# Patient Record
Sex: MALE | Race: WHITE | Employment: OTHER | URBAN - METROPOLITAN AREA
[De-identification: names, ages, dates, MRNs, and addresses within clinical notes are randomized per-mention and may not be internally consistent; named-entity substitution may affect disease eponyms.]

---

## 2020-08-30 ENCOUNTER — HOSPITAL ENCOUNTER (OUTPATIENT)
Dept: PREADMISSION TESTING | Age: 85
Setting detail: SPECIMEN
Discharge: HOME | End: 2020-09-03
Payer: COMMERCIAL

## 2020-08-30 PROCEDURE — U0003 INFECTIOUS AGENT DETECTION BY NUCLEIC ACID (DNA OR RNA); SEVERE ACUTE RESPIRATORY SYNDROME CORONAVIRUS 2 (SARS-COV-2) (CORONAVIRUS DISEASE [COVID-19]), AMPLIFIED PROBE TECHNIQUE, MAKING USE OF HIGH THROUGHPUT TECHNOLOGIES AS DESCRIBED BY CMS-2020-01-R: HCPCS

## 2020-08-31 ENCOUNTER — HOSPITAL ENCOUNTER (OUTPATIENT)
Age: 85
Discharge: HOME | End: 2020-08-31
Payer: MEDICARE

## 2020-08-31 LAB
ABO/RH: NORMAL
ANION GAP SERPL CALCULATED.3IONS-SCNC: 10 MMOL/L (ref 9–17)
ANTIBODY SCREEN: NEGATIVE
ARM BAND NUMBER: NORMAL
BUN BLDV-MCNC: 17 MG/DL (ref 8–23)
BUN/CREAT BLD: ABNORMAL (ref 9–20)
CALCIUM SERPL-MCNC: 9.3 MG/DL (ref 8.6–10.4)
CHLORIDE BLD-SCNC: 103 MMOL/L (ref 98–107)
CO2: 30 MMOL/L (ref 20–31)
CREAT SERPL-MCNC: 0.81 MG/DL (ref 0.7–1.2)
EXPIRATION DATE: NORMAL
GFR AFRICAN AMERICAN: >60 ML/MIN
GFR NON-AFRICAN AMERICAN: >60 ML/MIN
GFR SERPL CREATININE-BSD FRML MDRD: ABNORMAL ML/MIN/{1.73_M2}
GFR SERPL CREATININE-BSD FRML MDRD: ABNORMAL ML/MIN/{1.73_M2}
GLUCOSE BLD-MCNC: 100 MG/DL (ref 70–99)
HCT VFR BLD CALC: 48.5 % (ref 40.7–50.3)
HEMOGLOBIN: 15.4 G/DL (ref 13–17)
INR BLD: 1
MCH RBC QN AUTO: 31.8 PG (ref 25.2–33.5)
MCHC RBC AUTO-ENTMCNC: 31.8 G/DL (ref 28.4–34.8)
MCV RBC AUTO: 100.2 FL (ref 82.6–102.9)
NRBC AUTOMATED: 0 PER 100 WBC
PDW BLD-RTO: 14.6 % (ref 11.8–14.4)
PLATELET # BLD: 238 K/UL (ref 138–453)
PMV BLD AUTO: 9.8 FL (ref 8.1–13.5)
POTASSIUM SERPL-SCNC: 4.5 MMOL/L (ref 3.7–5.3)
PROTHROMBIN TIME: 10.6 SEC (ref 9–12)
RBC # BLD: 4.84 M/UL (ref 4.21–5.77)
SODIUM BLD-SCNC: 143 MMOL/L (ref 135–144)
WBC # BLD: 6.7 K/UL (ref 3.5–11.3)

## 2020-08-31 PROCEDURE — 80048 BASIC METABOLIC PNL TOTAL CA: CPT

## 2020-08-31 PROCEDURE — 86901 BLOOD TYPING SEROLOGIC RH(D): CPT

## 2020-08-31 PROCEDURE — 86900 BLOOD TYPING SEROLOGIC ABO: CPT

## 2020-08-31 PROCEDURE — 85027 COMPLETE CBC AUTOMATED: CPT

## 2020-08-31 PROCEDURE — 85610 PROTHROMBIN TIME: CPT

## 2020-08-31 PROCEDURE — 86850 RBC ANTIBODY SCREEN: CPT

## 2020-08-31 PROCEDURE — 36415 COLL VENOUS BLD VENIPUNCTURE: CPT

## 2020-09-01 LAB — SARS-COV-2, NAA: NOT DETECTED

## 2020-09-02 ENCOUNTER — TELEPHONE (OUTPATIENT)
Dept: PRIMARY CARE CLINIC | Age: 85
End: 2020-09-02

## 2020-09-02 ENCOUNTER — ANESTHESIA EVENT (OUTPATIENT)
Dept: CARDIAC CATH/INVASIVE PROCEDURES | Age: 85
End: 2020-09-02

## 2020-09-03 ENCOUNTER — HOSPITAL ENCOUNTER (INPATIENT)
Dept: CARDIAC CATH/INVASIVE PROCEDURES | Age: 85
LOS: 1 days | Discharge: HOME | DRG: 269 | End: 2020-09-04
Attending: SURGERY | Admitting: SURGERY
Payer: MEDICARE

## 2020-09-03 ENCOUNTER — ANESTHESIA (OUTPATIENT)
Dept: CARDIAC CATH/INVASIVE PROCEDURES | Age: 85
End: 2020-09-03

## 2020-09-03 VITALS — OXYGEN SATURATION: 94 % | TEMPERATURE: 94.8 F | DIASTOLIC BLOOD PRESSURE: 69 MMHG | SYSTOLIC BLOOD PRESSURE: 105 MMHG

## 2020-09-03 PROBLEM — I71.40 AAA (ABDOMINAL AORTIC ANEURYSM) WITHOUT RUPTURE: Status: ACTIVE | Noted: 2020-09-03

## 2020-09-03 LAB
BILIRUBIN URINE: NEGATIVE
COLOR: YELLOW
COMMENT UA: NORMAL
GLUCOSE URINE: NEGATIVE
KETONES, URINE: NEGATIVE
LEUKOCYTE ESTERASE, URINE: NEGATIVE
NITRITE, URINE: NEGATIVE
PH UA: 5 (ref 5–8)
PROTEIN UA: NEGATIVE
SPECIFIC GRAVITY UA: 1.01 (ref 1–1.03)
TURBIDITY: CLEAR
URINE HGB: NEGATIVE
UROBILINOGEN, URINE: NORMAL

## 2020-09-03 PROCEDURE — C1887 CATHETER, GUIDING: HCPCS

## 2020-09-03 PROCEDURE — C1894 INTRO/SHEATH, NON-LASER: HCPCS

## 2020-09-03 PROCEDURE — 2500000003 HC RX 250 WO HCPCS

## 2020-09-03 PROCEDURE — 3700000001 HC ADD 15 MINUTES (ANESTHESIA)

## 2020-09-03 PROCEDURE — C1769 GUIDE WIRE: HCPCS

## 2020-09-03 PROCEDURE — 6370000000 HC RX 637 (ALT 250 FOR IP): Performed by: SURGERY

## 2020-09-03 PROCEDURE — 37799 UNLISTED PX VASCULAR SURGERY: CPT

## 2020-09-03 PROCEDURE — 34712 TCAT DLVR ENHNCD FIXJ DEV: CPT | Performed by: SURGERY

## 2020-09-03 PROCEDURE — 81003 URINALYSIS AUTO W/O SCOPE: CPT

## 2020-09-03 PROCEDURE — 34705 EVAC RPR A-BIILIAC NDGFT: CPT | Performed by: SURGERY

## 2020-09-03 PROCEDURE — 2720000010 HC SURG SUPPLY STERILE

## 2020-09-03 PROCEDURE — 2780000010 HC IMPLANT OTHER

## 2020-09-03 PROCEDURE — 04V03DZ RESTRICTION OF ABDOMINAL AORTA WITH INTRALUMINAL DEVICE, PERCUTANEOUS APPROACH: ICD-10-PCS | Performed by: SURGERY

## 2020-09-03 PROCEDURE — 2500000003 HC RX 250 WO HCPCS: Performed by: STUDENT IN AN ORGANIZED HEALTH CARE EDUCATION/TRAINING PROGRAM

## 2020-09-03 PROCEDURE — 6360000004 HC RX CONTRAST MEDICATION

## 2020-09-03 PROCEDURE — 2580000003 HC RX 258: Performed by: SURGERY

## 2020-09-03 PROCEDURE — C1725 CATH, TRANSLUMIN NON-LASER: HCPCS

## 2020-09-03 PROCEDURE — 34713 PERQ ACCESS & CLSR FEM ART: CPT | Performed by: SURGERY

## 2020-09-03 PROCEDURE — 6360000002 HC RX W HCPCS: Performed by: SURGERY

## 2020-09-03 PROCEDURE — 6360000002 HC RX W HCPCS

## 2020-09-03 PROCEDURE — 2709999900 HC NON-CHARGEABLE SUPPLY

## 2020-09-03 PROCEDURE — 3700000000 HC ANESTHESIA ATTENDED CARE

## 2020-09-03 PROCEDURE — C1760 CLOSURE DEV, VASC: HCPCS

## 2020-09-03 PROCEDURE — G0269 OCCLUSIVE DEVICE IN VEIN ART: HCPCS | Performed by: SURGERY

## 2020-09-03 RX ORDER — HYDROCODONE BITARTRATE AND ACETAMINOPHEN 5; 325 MG/1; MG/1
2 TABLET ORAL EVERY 4 HOURS PRN
Status: DISCONTINUED | OUTPATIENT
Start: 2020-09-03 | End: 2020-09-04 | Stop reason: HOSPADM

## 2020-09-03 RX ORDER — ATENOLOL 50 MG/1
50 TABLET ORAL DAILY
COMMUNITY

## 2020-09-03 RX ORDER — ENALAPRILAT 2.5 MG/2ML
2.5 INJECTION INTRAVENOUS ONCE
Status: COMPLETED | OUTPATIENT
Start: 2020-09-03 | End: 2020-09-03

## 2020-09-03 RX ORDER — ONDANSETRON 2 MG/ML
4 INJECTION INTRAMUSCULAR; INTRAVENOUS EVERY 6 HOURS PRN
Status: DISCONTINUED | OUTPATIENT
Start: 2020-09-03 | End: 2020-09-04

## 2020-09-03 RX ORDER — LORATADINE 10 MG/1
10 TABLET ORAL DAILY
COMMUNITY

## 2020-09-03 RX ORDER — LIDOCAINE HYDROCHLORIDE 10 MG/ML
INJECTION, SOLUTION EPIDURAL; INFILTRATION; INTRACAUDAL; PERINEURAL PRN
Status: DISCONTINUED | OUTPATIENT
Start: 2020-09-03 | End: 2020-09-03 | Stop reason: SDUPTHER

## 2020-09-03 RX ORDER — SODIUM CHLORIDE 0.9 % (FLUSH) 0.9 %
10 SYRINGE (ML) INJECTION EVERY 12 HOURS SCHEDULED
Status: DISCONTINUED | OUTPATIENT
Start: 2020-09-03 | End: 2020-09-04 | Stop reason: HOSPADM

## 2020-09-03 RX ORDER — FENTANYL CITRATE 50 UG/ML
25 INJECTION, SOLUTION INTRAMUSCULAR; INTRAVENOUS EVERY 5 MIN PRN
Status: DISCONTINUED | OUTPATIENT
Start: 2020-09-03 | End: 2020-09-03

## 2020-09-03 RX ORDER — HYDRALAZINE HYDROCHLORIDE 20 MG/ML
10 INJECTION INTRAMUSCULAR; INTRAVENOUS ONCE
Status: DISCONTINUED | OUTPATIENT
Start: 2020-09-03 | End: 2020-09-03

## 2020-09-03 RX ORDER — SODIUM CHLORIDE 9 MG/ML
INJECTION, SOLUTION INTRAVENOUS CONTINUOUS
Status: DISCONTINUED | OUTPATIENT
Start: 2020-09-03 | End: 2020-09-04

## 2020-09-03 RX ORDER — MIDAZOLAM HYDROCHLORIDE 1 MG/ML
INJECTION INTRAMUSCULAR; INTRAVENOUS PRN
Status: DISCONTINUED | OUTPATIENT
Start: 2020-09-03 | End: 2020-09-03 | Stop reason: SDUPTHER

## 2020-09-03 RX ORDER — GUAIFENESIN 600 MG/1
1200 TABLET, EXTENDED RELEASE ORAL 2 TIMES DAILY
Status: DISCONTINUED | OUTPATIENT
Start: 2020-09-03 | End: 2020-09-04 | Stop reason: HOSPADM

## 2020-09-03 RX ORDER — OXYCODONE HYDROCHLORIDE AND ACETAMINOPHEN 5; 325 MG/1; MG/1
1 TABLET ORAL PRN
Status: DISCONTINUED | OUTPATIENT
Start: 2020-09-03 | End: 2020-09-03

## 2020-09-03 RX ORDER — ONDANSETRON 2 MG/ML
4 INJECTION INTRAMUSCULAR; INTRAVENOUS
Status: DISCONTINUED | OUTPATIENT
Start: 2020-09-03 | End: 2020-09-03

## 2020-09-03 RX ORDER — DIPHENHYDRAMINE HYDROCHLORIDE 50 MG/ML
12.5 INJECTION INTRAMUSCULAR; INTRAVENOUS
Status: DISCONTINUED | OUTPATIENT
Start: 2020-09-03 | End: 2020-09-03

## 2020-09-03 RX ORDER — DEXAMETHASONE SODIUM PHOSPHATE 4 MG/ML
4 INJECTION, SOLUTION INTRA-ARTICULAR; INTRALESIONAL; INTRAMUSCULAR; INTRAVENOUS; SOFT TISSUE
Status: DISCONTINUED | OUTPATIENT
Start: 2020-09-03 | End: 2020-09-03

## 2020-09-03 RX ORDER — SODIUM CHLORIDE 0.9 % (FLUSH) 0.9 %
10 SYRINGE (ML) INJECTION PRN
Status: DISCONTINUED | OUTPATIENT
Start: 2020-09-03 | End: 2020-09-04 | Stop reason: HOSPADM

## 2020-09-03 RX ORDER — PROMETHAZINE HYDROCHLORIDE 25 MG/1
12.5 TABLET ORAL EVERY 6 HOURS PRN
Status: DISCONTINUED | OUTPATIENT
Start: 2020-09-03 | End: 2020-09-04 | Stop reason: HOSPADM

## 2020-09-03 RX ORDER — MORPHINE SULFATE 2 MG/ML
2 INJECTION, SOLUTION INTRAMUSCULAR; INTRAVENOUS
Status: DISCONTINUED | OUTPATIENT
Start: 2020-09-03 | End: 2020-09-04 | Stop reason: HOSPADM

## 2020-09-03 RX ORDER — BUPIVACAINE HYDROCHLORIDE 7.5 MG/ML
INJECTION, SOLUTION INTRASPINAL PRN
Status: DISCONTINUED | OUTPATIENT
Start: 2020-09-03 | End: 2020-09-03 | Stop reason: SDUPTHER

## 2020-09-03 RX ORDER — FUROSEMIDE 40 MG/1
40 TABLET ORAL DAILY
Status: DISCONTINUED | OUTPATIENT
Start: 2020-09-03 | End: 2020-09-04 | Stop reason: HOSPADM

## 2020-09-03 RX ORDER — ACETAMINOPHEN 325 MG/1
650 TABLET ORAL EVERY 4 HOURS PRN
Status: DISCONTINUED | OUTPATIENT
Start: 2020-09-03 | End: 2020-09-04 | Stop reason: HOSPADM

## 2020-09-03 RX ORDER — FENTANYL CITRATE 50 UG/ML
INJECTION, SOLUTION INTRAMUSCULAR; INTRAVENOUS PRN
Status: DISCONTINUED | OUTPATIENT
Start: 2020-09-03 | End: 2020-09-03 | Stop reason: SDUPTHER

## 2020-09-03 RX ORDER — FUROSEMIDE 40 MG/1
40 TABLET ORAL DAILY
COMMUNITY

## 2020-09-03 RX ORDER — IBUPROFEN 800 MG/1
800 TABLET ORAL DAILY
COMMUNITY

## 2020-09-03 RX ORDER — LABETALOL HYDROCHLORIDE 5 MG/ML
5 INJECTION, SOLUTION INTRAVENOUS EVERY 10 MIN PRN
Status: DISCONTINUED | OUTPATIENT
Start: 2020-09-03 | End: 2020-09-03

## 2020-09-03 RX ORDER — PROPOFOL 10 MG/ML
INJECTION, EMULSION INTRAVENOUS CONTINUOUS PRN
Status: DISCONTINUED | OUTPATIENT
Start: 2020-09-03 | End: 2020-09-03 | Stop reason: SDUPTHER

## 2020-09-03 RX ORDER — OXYCODONE HYDROCHLORIDE AND ACETAMINOPHEN 5; 325 MG/1; MG/1
2 TABLET ORAL PRN
Status: DISCONTINUED | OUTPATIENT
Start: 2020-09-03 | End: 2020-09-03

## 2020-09-03 RX ORDER — HEPARIN SODIUM 1000 [USP'U]/ML
INJECTION, SOLUTION INTRAVENOUS; SUBCUTANEOUS PRN
Status: DISCONTINUED | OUTPATIENT
Start: 2020-09-03 | End: 2020-09-03 | Stop reason: SDUPTHER

## 2020-09-03 RX ORDER — EPHEDRINE SULFATE/0.9% NACL/PF 50 MG/5 ML
SYRINGE (ML) INTRAVENOUS PRN
Status: DISCONTINUED | OUTPATIENT
Start: 2020-09-03 | End: 2020-09-03 | Stop reason: SDUPTHER

## 2020-09-03 RX ORDER — IBUPROFEN 800 MG/1
800 TABLET ORAL DAILY
Status: DISCONTINUED | OUTPATIENT
Start: 2020-09-03 | End: 2020-09-04 | Stop reason: HOSPADM

## 2020-09-03 RX ORDER — HYDRALAZINE HYDROCHLORIDE 20 MG/ML
5 INJECTION INTRAMUSCULAR; INTRAVENOUS EVERY 10 MIN PRN
Status: DISCONTINUED | OUTPATIENT
Start: 2020-09-03 | End: 2020-09-03

## 2020-09-03 RX ORDER — GLYCOPYRROLATE 1 MG/5 ML
SYRINGE (ML) INTRAVENOUS PRN
Status: DISCONTINUED | OUTPATIENT
Start: 2020-09-03 | End: 2020-09-03 | Stop reason: SDUPTHER

## 2020-09-03 RX ORDER — GUAIFENESIN 600 MG/1
1200 TABLET, EXTENDED RELEASE ORAL 2 TIMES DAILY
COMMUNITY

## 2020-09-03 RX ORDER — ENALAPRILAT 2.5 MG/2ML
INJECTION INTRAVENOUS
Status: DISPENSED
Start: 2020-09-03 | End: 2020-09-04

## 2020-09-03 RX ORDER — MORPHINE SULFATE 4 MG/ML
4 INJECTION, SOLUTION INTRAMUSCULAR; INTRAVENOUS
Status: DISCONTINUED | OUTPATIENT
Start: 2020-09-03 | End: 2020-09-04 | Stop reason: HOSPADM

## 2020-09-03 RX ORDER — ATENOLOL 50 MG/1
50 TABLET ORAL DAILY
Status: DISCONTINUED | OUTPATIENT
Start: 2020-09-03 | End: 2020-09-04 | Stop reason: HOSPADM

## 2020-09-03 RX ORDER — HYDROCODONE BITARTRATE AND ACETAMINOPHEN 5; 325 MG/1; MG/1
1 TABLET ORAL EVERY 4 HOURS PRN
Status: DISCONTINUED | OUTPATIENT
Start: 2020-09-03 | End: 2020-09-04 | Stop reason: HOSPADM

## 2020-09-03 RX ADMIN — MIDAZOLAM HYDROCHLORIDE 1 MG: 1 INJECTION INTRAMUSCULAR; INTRAVENOUS at 08:40

## 2020-09-03 RX ADMIN — FENTANYL CITRATE 25 MCG: 50 INJECTION, SOLUTION INTRAMUSCULAR; INTRAVENOUS at 10:10

## 2020-09-03 RX ADMIN — LIDOCAINE HYDROCHLORIDE 3 ML: 10 INJECTION, SOLUTION EPIDURAL; INFILTRATION; INTRACAUDAL; PERINEURAL at 08:48

## 2020-09-03 RX ADMIN — Medication 5 MG: at 09:17

## 2020-09-03 RX ADMIN — ENOXAPARIN SODIUM 40 MG: 40 INJECTION SUBCUTANEOUS at 12:20

## 2020-09-03 RX ADMIN — Medication 5 MG: at 10:10

## 2020-09-03 RX ADMIN — Medication 5 MG: at 10:30

## 2020-09-03 RX ADMIN — SODIUM CHLORIDE, PRESERVATIVE FREE 10 ML: 5 INJECTION INTRAVENOUS at 11:25

## 2020-09-03 RX ADMIN — SODIUM CHLORIDE: 9 INJECTION, SOLUTION INTRAVENOUS at 07:59

## 2020-09-03 RX ADMIN — Medication 5 MG: at 09:36

## 2020-09-03 RX ADMIN — FENTANYL CITRATE 50 MCG: 50 INJECTION, SOLUTION INTRAMUSCULAR; INTRAVENOUS at 08:40

## 2020-09-03 RX ADMIN — Medication 5 MG: at 10:38

## 2020-09-03 RX ADMIN — SODIUM CHLORIDE, PRESERVATIVE FREE 10 ML: 5 INJECTION INTRAVENOUS at 20:50

## 2020-09-03 RX ADMIN — GUAIFENESIN 1200 MG: 600 TABLET, EXTENDED RELEASE ORAL at 20:50

## 2020-09-03 RX ADMIN — FENTANYL CITRATE 25 MCG: 50 INJECTION, SOLUTION INTRAMUSCULAR; INTRAVENOUS at 10:28

## 2020-09-03 RX ADMIN — SODIUM CHLORIDE: 9 INJECTION, SOLUTION INTRAVENOUS at 10:21

## 2020-09-03 RX ADMIN — MIDAZOLAM HYDROCHLORIDE 1 MG: 1 INJECTION INTRAMUSCULAR; INTRAVENOUS at 10:10

## 2020-09-03 RX ADMIN — SODIUM CHLORIDE: 9 INJECTION, SOLUTION INTRAVENOUS at 11:24

## 2020-09-03 RX ADMIN — HEPARIN SODIUM 2000 UNITS: 1000 INJECTION, SOLUTION INTRAVENOUS; SUBCUTANEOUS at 10:20

## 2020-09-03 RX ADMIN — CEFAZOLIN 2 G: 10 INJECTION, POWDER, FOR SOLUTION INTRAVENOUS at 12:20

## 2020-09-03 RX ADMIN — Medication 5 MG: at 10:50

## 2020-09-03 RX ADMIN — HYDROCODONE BITARTRATE AND ACETAMINOPHEN 2 TABLET: 5; 325 TABLET ORAL at 18:30

## 2020-09-03 RX ADMIN — CEFAZOLIN 2 G: 10 INJECTION, POWDER, FOR SOLUTION INTRAVENOUS at 18:30

## 2020-09-03 RX ADMIN — PROPOFOL 25 MCG/KG/MIN: 10 INJECTION, EMULSION INTRAVENOUS at 09:20

## 2020-09-03 RX ADMIN — ENALAPRILAT 2.5 MG: 1.25 INJECTION INTRAVENOUS at 13:09

## 2020-09-03 RX ADMIN — BUPIVACAINE HYDROCHLORIDE 2 ML: 7.5 INJECTION, SOLUTION INTRASPINAL at 08:48

## 2020-09-03 RX ADMIN — HEPARIN SODIUM 6000 UNITS: 1000 INJECTION, SOLUTION INTRAVENOUS; SUBCUTANEOUS at 09:20

## 2020-09-03 RX ADMIN — Medication 0.2 MG: at 09:11

## 2020-09-03 ASSESSMENT — ENCOUNTER SYMPTOMS: SHORTNESS OF BREATH: 1

## 2020-09-03 ASSESSMENT — PULMONARY FUNCTION TESTS
PIF_VALUE: 0
PIF_VALUE: 1
PIF_VALUE: 0
PIF_VALUE: 1
PIF_VALUE: 1
PIF_VALUE: 0
PIF_VALUE: 1
PIF_VALUE: 1
PIF_VALUE: 0
PIF_VALUE: 1
PIF_VALUE: 0
PIF_VALUE: 1
PIF_VALUE: 0
PIF_VALUE: 1
PIF_VALUE: 0
PIF_VALUE: 1
PIF_VALUE: 0
PIF_VALUE: 1
PIF_VALUE: 0
PIF_VALUE: 1
PIF_VALUE: 0
PIF_VALUE: 1
PIF_VALUE: 0
PIF_VALUE: 1
PIF_VALUE: 0
PIF_VALUE: 31
PIF_VALUE: 0
PIF_VALUE: 0
PIF_VALUE: 1
PIF_VALUE: 0
PIF_VALUE: 1
PIF_VALUE: 0
PIF_VALUE: 1
PIF_VALUE: 0
PIF_VALUE: 1
PIF_VALUE: 0
PIF_VALUE: 29
PIF_VALUE: 0
PIF_VALUE: 1
PIF_VALUE: 0
PIF_VALUE: 1

## 2020-09-03 ASSESSMENT — PAIN SCALES - GENERAL
PAINLEVEL_OUTOF10: 9
PAINLEVEL_OUTOF10: 0

## 2020-09-03 NOTE — ANESTHESIA PRE PROCEDURE
10.6 08/31/2020    INR 1.0 08/31/2020       HCG (If Applicable): No results found for: PREGTESTUR, PREGSERUM, HCG, HCGQUANT     ABGs: No results found for: PHART, PO2ART, XOD5UCT, EMU7BZB, BEART, P8LMZJVQ     Type & Screen (If Applicable):  No results found for: LABABO, LABRH    Drug/Infectious Status (If Applicable):  No results found for: HIV, HEPCAB    COVID-19 Screening (If Applicable):   Lab Results   Component Value Date    COVID19 Not Detected 08/30/2020         Anesthesia Evaluation  Patient summary reviewed and Nursing notes reviewed  Airway: Mallampati: II  TM distance: >3 FB   Neck ROM: full  Mouth opening: > = 3 FB Dental: normal exam         Pulmonary:Negative Pulmonary ROS and normal exam  breath sounds clear to auscultation  (+) shortness of breath:                             Cardiovascular:Negative CV ROS  Exercise tolerance: poor (<4 METS),   (+) past MI:,       ECG reviewed  Rhythm: regular  Rate: normal                    Neuro/Psych:   Negative Neuro/Psych ROS              GI/Hepatic/Renal: Neg GI/Hepatic/Renal ROS            Endo/Other: Negative Endo/Other ROS                    Abdominal:       Abdomen: soft. Vascular:                                      Anesthesia Plan      general and MAC     ASA 4     (Will plan for MAC and Spinal anesthesia, and General anesthesia as back up.)  Induction: intravenous. arterial line  MIPS: Postoperative opioids intended and Prophylactic antiemetics administered. Anesthetic plan and risks discussed with patient and child/children. Use of blood products discussed with patient and child/children whom consented to blood products. Plan discussed with attending and CRNA.     Attending anesthesiologist reviewed and agrees with Dori Estes MD   9/3/2020

## 2020-09-03 NOTE — H&P
VASCULAR SURGERY H&P  [address]      Patient's Name/ Date of Birth/ Gender: Dominique Lob / 1935 [de-identified]81 y.o.) / male     Surgeon: Dr. Carlos Rose    History of present Illness: Mr. Elvira Ledesma is an 81-year-old gentleman with history of COPD on 4 L nasal cannula and short-term memory loss and recently found to have a 5.2 cm infrarenal AAA on CTA during a urologic work-up. He presents to [facility] for scheduled AAA repair today. He has a history of COPD, hypertension, prostate cancer, skin cancer, and there is concern for malignancy within the lung on recent CT scan. He has had tonsillectomy, prostatectomy, and left-sided hernia repair. Past Medical History:  has a past medical history of Aortic aneurysm ([address] Arthritis, COPD (chronic obstructive pulmonary disease) ([address] BROWN (dyspnea on exertion), Former smoker, Herpes genitalis, HTN (hypertension), Inguinal hernia, On home oxygen therapy, Prostate cancer ([address] Short-term memory loss, Skin cancer, and Walker as ambulation aid. Past Surgical History:   Past Surgical History:   Procedure Laterality Date    ENDOVASCULAR THORACIC AORTIC ANEURYSM REPAIR  09/03/2020    DR Jose Manuel Martinez REYES    HERNIA REPAIR      PROSTATECTOMY      SKIN CANCER EXCISION      FOREHEAD / RIGHT CHEST    TONSILLECTOMY AND ADENOIDECTOMY         Social History:  reports that he has quit smoking. He has never used smokeless tobacco. He reports current alcohol use. He reports that he does not use drugs. Family History: family history is not on file.     Review of Systems:   General: Denies fever, chills, night sweats, weight loss, malaise, fatigue  HEENT: Denies sore throat, sinus problems, allergic rhinosinusitis  Card: Denies chest pain, palpitations, orthopnea/PND  Pulm: Denies cough, shortness of breath  GI: denies history of constipation, diarrhea, hematochezia or melena  : Denies polyuria, dysuria, hematuria  Endo: Denies diabetes, thyroid problems. Heme: Denies anemia, h/o bleeding or clotting problems. Neuro: Denies h/o CVA, TIA, +memory loss  Skin: Denies rashes, ulcers  Musculoskeletal: Denies muscle, joint, back pain. Allergies: Pcn [penicillins]    Current Meds:  Current Outpatient Medications:     atenolol (TENORMIN) 50 MG tablet, Take 50 mg by mouth daily, Disp: , Rfl:     furosemide (LASIX) 40 MG tablet, Take 40 mg by mouth daily, Disp: , Rfl:     ibuprofen (ADVIL;MOTRIN) 800 MG tablet, Take 800 mg by mouth daily, Disp: , Rfl:     NONFORMULARY, Take 1 tablet by mouth 2 times daily PRESER VISION  /  AREDS2, Disp: , Rfl:     guaiFENesin (MUCINEX) 600 MG extended release tablet, Take 1,200 mg by mouth 2 times daily, Disp: , Rfl:     loratadine (CLARITIN) 10 MG tablet, Take 10 mg by mouth daily, Disp: , Rfl:     Multiple Vitamins-Minerals (CENTRAL-DEANDRA PO), Take 1 tablet by mouth every evening, Disp: , Rfl:     Current Facility-Administered Medications:     ceFAZolin (ANCEF) 2 g in dextrose 5 % 50 mL IVPB, 2 g, Intravenous, Once, Ramu Hamilton MD    0.9 % sodium chloride infusion, , Intravenous, Continuous, Ramu Hamilton MD, Last Rate: 75 mL/hr at 09/03/20 0759    Vital Signs:  Vitals:    09/03/20 0752   BP: 131/76   Pulse: 59   Resp: 20   Temp: 98.2 °F (36.8 °C)   SpO2: 95%       Physical Exam:  Vital signs and Nurse's note reviewed  Gen:  A&Ox3, NAD  HEENT: PERRLA, EOMI, no scleral icterus, oral mucosa moist  Neck: Supple  Chest: Symmetric rise with inhalation, no evidence of trauma  CVS: Regular rate and rhythm, no murmurs, no rubs or gallops  Resp: Equal chest rise bilaterally, on 4 L nasal cannula  Abd: soft, obese, non-tender, non-distended, no guarding or rebound.   Ext: Bilateral lower extremity edema  CNS: Moves all extremities, no gross focal motor deficits    Labs:   Lab Results   Component Value Date    WBC 6.7 08/31/2020    HGB 15.4 08/31/2020    HCT 48.5 08/31/2020    .2 08/31/2020     08/31/2020     Lab Results   Component Value Date     08/31/2020    K 4.5 08/31/2020     08/31/2020    CO2 30 08/31/2020    BUN 17 08/31/2020    CREATININE 0.81 08/31/2020    GLUCOSE 100 08/31/2020    CALCIUM 9.3 08/31/2020     No results found for: ALKPHOS, ALT, AST, PROT, BILITOT, BILIDIR, LABALBU  No results found for: LACTA  No results found for: AMYLASE  No results found for: LIPASE  Lab Results   Component Value Date    INR 1.0 08/31/2020       Imaging:  CTA abdomen (3/12/2020)  5.2 cm short axis diameter infrarenal AAA without evidence of leak or rupture, aneurysm ends at aortic bifurcation. Ectasia of the infrarenal aorta and focal kinking just below the renal artery origin. No evidence of hemodynamically significant stenosis. Ectasia of bilateral common iliac arteries. External iliac arteries are patent but tortuous. 2.5 x 2.4 cm irregular nodule in left lung base concerning for malignancy. Impression:  81-year-old male with history of 5.2 cm infrarenal AAA presenting for elective endovascular repair. Recommendation:  1. To OR today for endovascular repair of AAA  2. Will attempt to do under spinal and local anesthesia due to medical comorbidities. 3.  Risks, benefits, and alternatives of repair were explained to patient and family at bedside and they agreed to continue with treatment plan.     Kacey Hinson MD  General Surgery PGY3  Available via [employer]  Attending: Arlin Garcia

## 2020-09-03 NOTE — ANESTHESIA PROCEDURE NOTES
Arterial Line:    An arterial line was placed using ultrasound guidance, in the OR for the following indication(s): continuous blood pressure monitoring. A 20 gauge (size), 1 and 3/4 inch (length), (type) catheter was placed, into the left radial artery, secured by . Events:  patient tolerated procedure well with no complications.   9/3/2020 9:00 AM9/3/2020 9:03 AM  Anesthesiologist: Penelope West MD  Resident/CRNA: SARAH Marin - PAXTON  Other anesthesia staff: Kamla Jones RN  Preanesthetic Checklist  Completed: patient identified, site marked, surgical consent, pre-op evaluation, timeout performed, IV checked, risks and benefits discussed, monitors and equipment checked, anesthesia consent given, oxygen available and patient being monitored

## 2020-09-03 NOTE — PROGRESS NOTES
Dr. Edu Rodriguez contacted for patients blood pressure -160s. Dr. Edu Rodriguez stated he wanted patients blood pressure to be made normotensive. Dr. Edu Rodriguez was made aware of patient's hypertensive state. No new orders.

## 2020-09-03 NOTE — PLAN OF CARE
Problem: Falls - Risk of:  Goal: Will remain free from falls  Description: Will remain free from falls  Outcome: Ongoing  Goal: Absence of physical injury  Description: Absence of physical injury  Outcome: Ongoing     Problem: Anxiety:  Goal: Level of anxiety will decrease  Description: Level of anxiety will decrease  Outcome: Met This Shift   PRE-PROCEDURAL EDUCATION COMPLETED

## 2020-09-03 NOTE — ANESTHESIA POSTPROCEDURE EVALUATION
Department of Anesthesiology  Postprocedure Note    Patient: Lowell Lowery  MRN: [id number]  YOB: 1935  Date of evaluation: 9/3/2020  Time:  4:34 PM     Procedure Summary     Date:  09/03/20 Room / Location:  [facility] Cath Lab    Anesthesia Start:  0825 Anesthesia Stop:  1015    Procedure:  AAA ENDOGRAFT W/ ANESTHESIA Diagnosis:       AAA (abdominal aortic aneurysm, ruptured) [address]      Endoleak post (EVAR) endovascular aneurysm repair, initial encounter ([facility]    Scheduled Providers:  Carli Roque MD Responsible Provider:  Carli Roque MD    Anesthesia Type:  general, MAC ASA Status:  3          Anesthesia Type: general, MAC    Kendall Phase I:      Kendall Phase II:      Last vitals: Reviewed and per EMR flowsheets.        Anesthesia Post Evaluation    Patient location during evaluation: PACU  Patient participation: complete - patient participated  Level of consciousness: awake and alert and awake  Pain score: 0  Airway patency: patent  Nausea & Vomiting: no nausea and no vomiting  Complications: no  Cardiovascular status: blood pressure returned to baseline  Respiratory status: acceptable  Hydration status: euvolemic

## 2020-09-03 NOTE — OP NOTE
Operative Note      Patient: Maru Madsen  YOB: 1935  MRN: [id number]    Date of Procedure: 9/3/2020    Pre-Op Diagnosis: Abdominal aortic aneurysm greater than 5 cm    Post-Op Diagnosis: Same       1) bilateral ultrasound-guided access common femoral arteries  2) endovascular repair of abdominal aortic aneurysm with endurance  3) fixation of the proximal neck with endo-anchors x8 and proximal cough      Surgeon: Austen Lucero MD    Assistant:  None    Anesthesia: Spinal, MAC    Estimated Blood Loss (mL): less than 50     Complications: None    Specimens:   * No specimens in log *    Implants:  * No implants in log *      Drains: * No LDAs found *    Findings: No evidence of endoleak on completion arteriogram    Indications and description of operative procedure: This is an 61-year-old male with COPD and home oxygen requirements. He was incidentally found to have a 5.3 cm infrarenal abdominal aortic aneurysm. He elected to undergo repair. After the risks benefits and alternatives were discussed informed consent was obtained. The patient was brought to the Cath Lab and placed supine spinal anesthesia was given along with IV sedation. The abdomen and groins were cleaned and prepped in the usual fashion sterile drapes were applied. Ultrasound was used to identify the bilateral common femoral arteries. Using a micropuncture needle and catheter access was gained. Both groins were preclosed with percutaneous closure devices x2. This was repeated in both the right and the left groin access sites. 8 Malagasy sheaths were then placed. The main body of the graft was planned for the left groin based on the patient's anatomy. A Glidewire was used to traverse the tortuous abdominal aorta into the thoracic aorta and a stiff Lunderquist wire was placed via the left side. The patient was heparinized with 6000 units of heparin and given an additional 2000 units after 1 hour.   Via the right groin access the Omni Flush catheter was placed to the level of the renal arteries. The main body the device was then advanced over the stiff wire and an aortogram of the neck was taken showing the renal arteries. This neck was highly angulated on CT scan at 90 degrees but straightened out somewhat with the stiff wire. The main body of the graft was then deployed below the level of the renal vessels in the suprarenal fixators were released. The graft was deployed until the contralateral limb was open. Using a steerable sheath the contralateral gate was successfully cannulated. A retrograde injection of contrast allowed for the right hypogastric to be shown in an English projection. An appropriately sized contralateral limb was then deployed a 16 x 24 x 124. Via the right femoral access the main body was deployed and again a retrograde shot in an LEA projection showed the hypogastric. A 16 x 20 x 124 was deployed on the left. A Reliant balloon was used to perform angioplasty of the entire length of the graft. On completion aortogram there was evidence of a type I endoleak and it appears as though 2 mm of the proximal graft was lower than expected below the renal artery takeoff. Endo-anchors were used to pin the right lateral wall against the aortic neck. An appropriate sized cuff was then deployed again infrarenally under magnification. An additional 5 endo-anchors were used to anchor the cuff into place. Balloon angioplasty was again performed of the proximal neck. On final completion arteriogram there is no evidence of endoleak whatsoever. The sheaths were then removed and the percutaneous closure devices were deployed. There is excellent hemostasis.   The procedure was tolerated well without complication

## 2020-09-03 NOTE — BRIEF OP NOTE
Brief Postoperative Note      Patient: Geni Kessler  YOB: 1935  MRN: [id number]    Date of Procedure: 9/3/2020    Pre-Op Diagnosis: Abdominal aortic aneurysm greater than 5 cm    Post-Op Diagnosis: Same       1) bilateral ultrasound-guided access common femoral arteries  2) endovascular repair of abdominal aortic aneurysm with endurance  3) fixation of the proximal neck with endo-anchors x8 and proximal cough      Surgeon: Kylee De Leon MD    Assistant:  None    Anesthesia: Spinal, MAC    Estimated Blood Loss (mL): less than 50     Complications: None    Specimens:   * No specimens in log *    Implants:  * No implants in log *      Drains: * No LDAs found *    Findings: No evidence of endoleak on completion arteriogram    Electronically signed by Kylee De Leon MD on 9/3/2020 at 11:06 AM

## 2020-09-04 VITALS
BODY MASS INDEX: 33.74 KG/M2 | WEIGHT: 249.12 LBS | HEIGHT: 72 IN | OXYGEN SATURATION: 95 % | HEART RATE: 66 BPM | RESPIRATION RATE: 21 BRPM | SYSTOLIC BLOOD PRESSURE: 91 MMHG | DIASTOLIC BLOOD PRESSURE: 48 MMHG | TEMPERATURE: 98.2 F

## 2020-09-04 PROBLEM — I71.40 AAA (ABDOMINAL AORTIC ANEURYSM): Status: ACTIVE | Noted: 2020-09-04

## 2020-09-04 LAB
ANION GAP SERPL CALCULATED.3IONS-SCNC: 7 MMOL/L (ref 9–17)
BUN BLDV-MCNC: 14 MG/DL (ref 8–23)
BUN/CREAT BLD: ABNORMAL (ref 9–20)
CALCIUM SERPL-MCNC: 8.2 MG/DL (ref 8.6–10.4)
CHLORIDE BLD-SCNC: 108 MMOL/L (ref 98–107)
CO2: 25 MMOL/L (ref 20–31)
CREAT SERPL-MCNC: 0.6 MG/DL (ref 0.7–1.2)
GFR AFRICAN AMERICAN: >60 ML/MIN
GFR NON-AFRICAN AMERICAN: >60 ML/MIN
GFR SERPL CREATININE-BSD FRML MDRD: ABNORMAL ML/MIN/{1.73_M2}
GFR SERPL CREATININE-BSD FRML MDRD: ABNORMAL ML/MIN/{1.73_M2}
GLUCOSE BLD-MCNC: 120 MG/DL (ref 70–99)
POTASSIUM SERPL-SCNC: 4.1 MMOL/L (ref 3.7–5.3)
SARS-COV-2, PCR: NORMAL
SARS-COV-2, RAPID: NOT DETECTED
SARS-COV-2: NORMAL
SODIUM BLD-SCNC: 140 MMOL/L (ref 135–144)
SOURCE: NORMAL

## 2020-09-04 PROCEDURE — 2580000003 HC RX 258: Performed by: SURGERY

## 2020-09-04 PROCEDURE — 80048 BASIC METABOLIC PNL TOTAL CA: CPT

## 2020-09-04 PROCEDURE — 6360000002 HC RX W HCPCS: Performed by: SURGERY

## 2020-09-04 PROCEDURE — 2000000000 HC ICU R&B

## 2020-09-04 PROCEDURE — U0002 COVID-19 LAB TEST NON-CDC: HCPCS

## 2020-09-04 PROCEDURE — 51798 US URINE CAPACITY MEASURE: CPT

## 2020-09-04 PROCEDURE — 6370000000 HC RX 637 (ALT 250 FOR IP): Performed by: SURGERY

## 2020-09-04 RX ORDER — ONDANSETRON 2 MG/ML
4 INJECTION INTRAMUSCULAR; INTRAVENOUS EVERY 6 HOURS PRN
Status: DISCONTINUED | OUTPATIENT
Start: 2020-09-04 | End: 2020-09-04 | Stop reason: HOSPADM

## 2020-09-04 RX ADMIN — ATENOLOL 50 MG: 50 TABLET ORAL at 07:48

## 2020-09-04 RX ADMIN — FUROSEMIDE 40 MG: 40 TABLET ORAL at 07:48

## 2020-09-04 RX ADMIN — GUAIFENESIN 1200 MG: 600 TABLET, EXTENDED RELEASE ORAL at 07:48

## 2020-09-04 RX ADMIN — ONDANSETRON 4 MG: 2 INJECTION, SOLUTION INTRAMUSCULAR; INTRAVENOUS at 02:19

## 2020-09-04 RX ADMIN — SODIUM CHLORIDE, PRESERVATIVE FREE 10 ML: 5 INJECTION INTRAVENOUS at 07:48

## 2020-09-04 RX ADMIN — IBUPROFEN 800 MG: 800 TABLET, FILM COATED ORAL at 07:48

## 2020-09-04 RX ADMIN — ENOXAPARIN SODIUM 40 MG: 40 INJECTION SUBCUTANEOUS at 07:48

## 2020-09-04 ASSESSMENT — PAIN SCALES - GENERAL
PAINLEVEL_OUTOF10: 0
PAINLEVEL_OUTOF10: 3

## 2020-09-04 NOTE — PROGRESS NOTES
Writer asked patient if he had voided at all since having his catheter removed. Pt states that he has not and is unwilling to attempt to urinate at this time. Pt states that he will notify writer and declines a bladder scan. States \"I just need some rest\".

## 2020-09-04 NOTE — PROGRESS NOTES
[facility] Progress Note            PATIENT NAME: Ashvin Members   TODAY'S DATE: 2020, 2:50 AM  DATE OF ADMISSION: 9/3/2020  LOS: 1    CC: No chief complaint on file. SUBJECTIVE:    Pt seen, evaluated, and chart reviewed. Pain controlled on current regimen. Hemodynamically stable. Tolerating diet. Voided at 0100 with 150 cc urine. Passing flatus. OBJECTIVE:     Vitals:  Temp  Av.9 °F (34.9 °C)  Min: 93.1 °F (33.9 °C)  Max: 98.8 °F (81.3 °C)  Systolic (06TCZ), ISB:021 , Min:100 , HLM:775   Diastolic (26JQY), RNL:02, Min:59, Max:115  Pulse  Av.3  Min: 56  Max: 85  Resp  Av.6  Min: 0  Max: 24  SpO2: 96 % SpO2  Av %  Min: 90 %  Max: 100 %     Intake/output:    I/O last 3 completed shifts: In: 2454 [P.O.:350; I.V.:2104]  Out: 100 [Blood:100]    Physical exam:        General: AOx3, NAD  Lungs: Equal chest rise bilaterally, no audible wheezes or rhonchi. Heart: Regular rate and rhythm. Warm and well perfused. Abdomen: Soft, ND, Nontender. Extremities: Moves all extremities x4, Bilateral lower extremity edema, Mild bilateral hematoma R>L, bilateral femoral puncture sites hemostatic. Labs:  No results for input(s): WBC, HGB, PLT in the last 72 hours. No results for input(s): NA, K, CL, CO2, BUN, CREATININE, GLUCOSE in the last 72 hours. No results for input(s): AST, ALT, ALB, ALKPHOS, BILITOT, BILIDIR in the last 72 hours. No results for input(s): APTT, PROT, INR in the last 72 hours. Radiology:  CTA abdomen (3/12/2020)  5.2 cm short axis diameter infrarenal AAA without evidence of leak or rupture, aneurysm ends at aortic bifurcation. Ectasia of the infrarenal aorta and focal kinking just below the renal artery origin. No evidence of hemodynamically significant stenosis. Ectasia of bilateral common iliac arteries. External iliac arteries are patent but tortuous. 2.5 x 2.4 cm irregular nodule in left lung base concerning for malignancy.     ASSESSMENT 61-year-old male with history of 5.2 cm infrarenal AAA s/p EVAR (9/3/20)    Problem List  Patient Active Problem List   Diagnosis    AAA (abdominal aortic aneurysm) without rupture (HCC)       PLAN  -Home meds: Lasix, atenolol-->started  -Pain and nausea control PRN  -Monitor vitals per unit standard  -Encourage IS, pulmonary hygeine  -Encourage ambulation  -Strict I/O's  -Diet: Regular  -VTE ppx: Lovenox    Dispo: Discharge home if voiding and and tolerating PO intake.      Zoila Francis MD  General Surgery PGY3  Available via [employer]  Attending: Noyr Shepherd MD

## 2020-09-04 NOTE — DISCHARGE INSTR - COC
Continuity of Care Form    Patient Name: Zhao Langston   :  1935  MRN:  [id number]    Admit date:  9/3/2020  Discharge date:  2020    Code Status Order: Full Code   Advance Directives:   [address] [facility] Documentation     Date/Time Healthcare Directive Type of Healthcare Directive Copy in [address] [place] [address] [address] Agent's Name Healthcare Agent's Phone Number    20 8888  No, patient does not have an advance directive for healthcare treatment -- -- -- -- --    20 0734  No, patient does not have an advance directive for healthcare treatment -- -- -- -- --          Admitting Physician:  Shannon Nunez MD  PCP: Gerhardt Duck, MD    Discharging Nurse: [facility] Unit/Room#: 0101/0101-01  Discharging Unit Phone Number: [phone]    Emergency Contact:   Extended Emergency Contact Information  Primary Emergency Contact: Mary Kay Waite  Address: N/A  Home Phone: [phone]  Relation: Child  Preferred language: English   needed? No  Secondary Emergency Contact: Olaf Lozano  Address: N/A  Home Phone: [phone]  Relation: Child  Preferred language: English   needed? No    Past Surgical History:  Past Surgical History:   Procedure Laterality Date    ENDOVASCULAR THORACIC AORTIC ANEURYSM REPAIR  2020    DR Bertie Engels REYES    HERNIA REPAIR      PROSTATECTOMY      SKIN CANCER EXCISION      FOREHEAD / RIGHT CHEST    TONSILLECTOMY AND ADENOIDECTOMY         Immunization History: There is no immunization history on file for this patient.     Active Problems:  Patient Active Problem List   Diagnosis Code    AAA (abdominal aortic aneurysm) without rupture ([facility]) I71.4    AAA (abdominal aortic aneurysm) ([facility] I71.4       Isolation/Infection:   Isolation          No Isolation        Patient Infection Status     Infection Onset Added Last Indicated Last Indicated By Review Planned Expiration Resolved Resolved By    None active Resolved    COVID-19 Rule Out 09/04/20 09/04/20 09/04/20 COVID-19 (Ordered)   09/04/20 Rule-Out Test Resulted    COVID-19 Rule Out 08/29/20 08/29/20 08/30/20 Covid-19 Ambulatory (Ordered)   09/01/20 Rule-Out Test Resulted          Nurse Assessment:  Last Vital Signs: BP (!) 91/48   Pulse 66   Temp 98.2 °F (36.8 °C) (Oral)   Resp 21   Ht 6' (1.829 m)   Wt 249 lb 1.9 oz (113 kg)   SpO2 95%   BMI 33.79 kg/m²     Last documented pain score (0-10 scale): Pain Level: 0  Last Weight:   Wt Readings from Last 1 Encounters:   09/03/20 249 lb 1.9 oz (113 kg)     Mental Status:  oriented    IV Access:  - None    Nursing Mobility/ADLs:  Walking   Independent  Transfer  Independent  Bathing  Independent  Dressing  Independent  Toileting  Independent  Feeding  Independent  Med Admin  Independent  Med Delivery   whole    Wound Care Documentation and Therapy:        Elimination:  Continence:   · Bowel: Yes  · Bladder: Yes  Urinary Catheter: None   Colostomy/Ileostomy/Ileal Conduit: No       Date of Last BM: Prior to admission    Intake/Output Summary (Last 24 hours) at 9/4/2020 1429  Last data filed at 9/4/2020 0600  Gross per 24 hour   Intake 1704 ml   Output 400 ml   Net 1304 ml     I/O last 3 completed shifts: In: 2454 [P.O.:350; I.V.:2104]  Out: 500 [Urine:400; Blood:100]    Safety Concerns: At Risk for Falls    Impairments/Disabilities:      Vision and Hearing    Nutrition Therapy:  Current Nutrition Therapy:   - Oral Diet:  General    Routes of Feeding: Oral  Liquids: No Restrictions  Daily Fluid Restriction: no  Last Modified Barium Swallow with Video (Video Swallowing Test): not done    Treatments at the Time of Hospital Discharge:   Respiratory Treatments: home 4L NC  Oxygen Therapy:  is on oxygen at 4 L/min per nasal cannula.   Ventilator:    - No ventilator support    Rehab Therapies: {THERAPEUTIC INTERVENTION:[id number]}  Weight Bearing Status/Restrictions: No weight bearing restirctions  Other Medical Equipment (for information only, NOT a DME order):  walker  Other Treatments: ***    Patient's personal belongings (please select all that are sent with patient):  Glasses, walker, socks, shoes, ankle brace, shirt, pants, underwear, toothbrush & tooth paste, belt    RN SIGNATURE:  Electronically signed by Genet Lizama RN on 9/4/20 at 2:46 PM EDT

## 2020-09-04 NOTE — CARE COORDINATION
Case Management Initial Discharge Plan  Chano Ayon,             Met with:patient to discuss discharge plans. Information verified: address, contacts, phone number, , insurance Yes    Emergency Contact/Next of Kin name & number: Steve Flores    PCP: Mary Carmen Hay MD  Date of last visit: few weeks    Insurance Provider: [employer] [place]    Discharge Planning    Living Arrangements:    alone  Support Systems:   3 children    Home has 1 stories  3 stairs to climb to get into front door, stairs to climb to reach second floor  Location of bedroom/bathroom in home       Patient able to perform ADL's:Assisted by 3 children     Current Services (outpatient & in home) none  DME equipment: Oxygen 4l rollator  DME provider: unsure of oxygen co    Receiving oral anticoagulation therapy? No    If indicated:   Physician managing anticoagulation treatment: na  Where does patient obtain lab work for ATC treatment? Potential Assistance Needed:       Patient agreeable to home care: No  [place] of choice provided:  n/a    Prior SNF/Rehab Placement and Facility: none  Agreeable to SNF/Rehab: No  [place] of choice provided: n/a     Evaluation: no    Expected Discharge date:       Patient expects to be discharged to: Follow Up Appointment: Best Day/ Time:      Transportation provider: Family  Transportation arrangements needed for discharge: No    Readmission Risk              Risk of Unplanned Readmission:        0             Does patient have a readmission risk score greater than 14?:   If yes, follow-up appointment must be made within 7 days of discharge. Goals of Care: resume adls      Discharge Plan: His children have moved him into an assisted living [facility] when he is discharged pt is aware of this and is looking forward to it.           Electronically signed by Leida Putnam RN on 20 at 10:58 AM EDT      Discharge Report    [facility])  Clinical Case Management Department  Written by: Adriana Miranda RN    Patient Name: María Armendariz  Attending Provider: Ramu Hamilton MD  Admit Date: 9/3/2020 11:19 AM  MRN: [id number]  Account: [de-identified]                     : 1935  Discharge Date: 2020      Disposition: pt's family will be transporting him to his new assisted living    Adriana Miranda RN

## 2020-09-10 NOTE — PROGRESS NOTES
Physician Progress Note      Sampson Cordova  CSN #:                  [id number]  :                       1935  ADMIT DATE:       9/3/2020 11:19 AM  DISCH DATE:        2020 6:10 PM  RESPONDING  PROVIDER #:        Omer Layton MD          QUERY TEXT:    Pt admitted with AAA for repair. Pt noted to have COPD on 4L NC from home. If possible, please document in the progress notes and discharge summary if   you are evaluating and/or treating any of the following: The medical record reflects the following:  Risk Factors: 81 yr old w/ COPD, concern for malignancy within the lung  Clinical Indicators: admitted with 4L NC  Treatment: 4L NC continued, SPO2 monitoring    Thank you in advance for your time, please contact me if I can be of any   assistance. Anna Luis RN, [facility], Supervisor  [phone]  Options provided:  -- Chronic respiratory failure with hypoxia  -- Chronic respiratory failure due to COPD  -- Chronic hypoxia without respiratory failure  -- Other - I will add my own diagnosis  -- Disagree - Not applicable / Not valid  -- Disagree - Clinically unable to determine / Unknown  -- Refer to Clinical Documentation Reviewer    PROVIDER RESPONSE TEXT:    This patient has chronic respiratory failure with hypoxia.     Query created by: Chucky Braden on 2020 3:00 PM      Electronically signed by:  Omer Layton MD 9/10/2020 12:37 PM

## 2020-12-17 ENCOUNTER — ANESTHESIA EVENT (OUTPATIENT)
Dept: CARDIAC CATH/INVASIVE PROCEDURES | Age: 85
End: 2020-12-17

## 2020-12-18 ENCOUNTER — HOSPITAL ENCOUNTER (INPATIENT)
Dept: CARDIAC CATH/INVASIVE PROCEDURES | Age: 85
LOS: 1 days | Discharge: HOME | DRG: 315 | End: 2020-12-19
Attending: SURGERY | Admitting: SURGERY
Payer: MEDICARE

## 2020-12-18 ENCOUNTER — ANESTHESIA (OUTPATIENT)
Dept: CARDIAC CATH/INVASIVE PROCEDURES | Age: 85
End: 2020-12-18

## 2020-12-18 VITALS
DIASTOLIC BLOOD PRESSURE: 69 MMHG | OXYGEN SATURATION: 95 % | TEMPERATURE: 96.8 F | SYSTOLIC BLOOD PRESSURE: 107 MMHG | RESPIRATION RATE: 13 BRPM

## 2020-12-18 LAB
GFR NON-AFRICAN AMERICAN: >60 ML/MIN
GFR SERPL CREATININE-BSD FRML MDRD: >60 ML/MIN
GFR SERPL CREATININE-BSD FRML MDRD: NORMAL ML/MIN/{1.73_M2}
GLUCOSE BLD-MCNC: 117 MG/DL (ref 74–100)
POC CHLORIDE: 102 MMOL/L (ref 98–107)
POC CREATININE: 0.9 MG/DL (ref 0.51–1.19)
POC HEMATOCRIT: 43 % (ref 41–53)
POC HEMOGLOBIN: 14.6 G/DL (ref 13.5–17.5)
POC POTASSIUM: 4.1 MMOL/L (ref 3.5–4.5)
POC SODIUM: 144 MMOL/L (ref 138–146)

## 2020-12-18 PROCEDURE — 75630 X-RAY AORTA LEG ARTERIES: CPT | Performed by: SURGERY

## 2020-12-18 PROCEDURE — 2709999900 HC NON-CHARGEABLE SUPPLY

## 2020-12-18 PROCEDURE — 04WY3DZ REVISION OF INTRALUMINAL DEVICE IN LOWER ARTERY, PERCUTANEOUS APPROACH: ICD-10-PCS | Performed by: SURGERY

## 2020-12-18 PROCEDURE — 2500000003 HC RX 250 WO HCPCS

## 2020-12-18 PROCEDURE — 84295 ASSAY OF SERUM SODIUM: CPT

## 2020-12-18 PROCEDURE — 82947 ASSAY GLUCOSE BLOOD QUANT: CPT

## 2020-12-18 PROCEDURE — C1760 CLOSURE DEV, VASC: HCPCS

## 2020-12-18 PROCEDURE — 85014 HEMATOCRIT: CPT

## 2020-12-18 PROCEDURE — 82435 ASSAY OF BLOOD CHLORIDE: CPT

## 2020-12-18 PROCEDURE — 6360000002 HC RX W HCPCS: Performed by: SURGERY

## 2020-12-18 PROCEDURE — C1769 GUIDE WIRE: HCPCS

## 2020-12-18 PROCEDURE — 6360000002 HC RX W HCPCS

## 2020-12-18 PROCEDURE — 2580000003 HC RX 258

## 2020-12-18 PROCEDURE — C1894 INTRO/SHEATH, NON-LASER: HCPCS

## 2020-12-18 PROCEDURE — 3700000001 HC ADD 15 MINUTES (ANESTHESIA)

## 2020-12-18 PROCEDURE — 2000000000 HC ICU R&B

## 2020-12-18 PROCEDURE — 6360000004 HC RX CONTRAST MEDICATION

## 2020-12-18 PROCEDURE — 2580000003 HC RX 258: Performed by: SURGERY

## 2020-12-18 PROCEDURE — 6370000000 HC RX 637 (ALT 250 FOR IP): Performed by: SURGERY

## 2020-12-18 PROCEDURE — 3700000000 HC ANESTHESIA ATTENDED CARE

## 2020-12-18 PROCEDURE — 2720000010 HC SURG SUPPLY STERILE

## 2020-12-18 PROCEDURE — 7100000000 HC PACU RECOVERY - FIRST 15 MIN

## 2020-12-18 PROCEDURE — 7100000001 HC PACU RECOVERY - ADDTL 15 MIN

## 2020-12-18 PROCEDURE — 93005 ELECTROCARDIOGRAM TRACING: CPT | Performed by: SURGERY

## 2020-12-18 PROCEDURE — C1725 CATH, TRANSLUMIN NON-LASER: HCPCS

## 2020-12-18 PROCEDURE — 34712 TCAT DLVR ENHNCD FIXJ DEV: CPT | Performed by: SURGERY

## 2020-12-18 PROCEDURE — G0269 OCCLUSIVE DEVICE IN VEIN ART: HCPCS | Performed by: SURGERY

## 2020-12-18 PROCEDURE — 36200 PLACE CATHETER IN AORTA: CPT | Performed by: SURGERY

## 2020-12-18 PROCEDURE — 82565 ASSAY OF CREATININE: CPT

## 2020-12-18 PROCEDURE — 84132 ASSAY OF SERUM POTASSIUM: CPT

## 2020-12-18 RX ORDER — FUROSEMIDE 40 MG/1
40 TABLET ORAL DAILY
Status: DISCONTINUED | OUTPATIENT
Start: 2020-12-18 | End: 2020-12-19 | Stop reason: HOSPADM

## 2020-12-18 RX ORDER — GUAIFENESIN 600 MG/1
1200 TABLET, EXTENDED RELEASE ORAL 2 TIMES DAILY
Status: DISCONTINUED | OUTPATIENT
Start: 2020-12-18 | End: 2020-12-19 | Stop reason: HOSPADM

## 2020-12-18 RX ORDER — SODIUM CHLORIDE 0.9 % (FLUSH) 0.9 %
10 SYRINGE (ML) INJECTION PRN
Status: DISCONTINUED | OUTPATIENT
Start: 2020-12-18 | End: 2020-12-19 | Stop reason: HOSPADM

## 2020-12-18 RX ORDER — OXYCODONE HYDROCHLORIDE AND ACETAMINOPHEN 5; 325 MG/1; MG/1
2 TABLET ORAL PRN
Status: DISCONTINUED | OUTPATIENT
Start: 2020-12-18 | End: 2020-12-18

## 2020-12-18 RX ORDER — LABETALOL HYDROCHLORIDE 5 MG/ML
5 INJECTION, SOLUTION INTRAVENOUS EVERY 10 MIN PRN
Status: DISCONTINUED | OUTPATIENT
Start: 2020-12-18 | End: 2020-12-18

## 2020-12-18 RX ORDER — SODIUM CHLORIDE 0.9 % (FLUSH) 0.9 %
10 SYRINGE (ML) INJECTION EVERY 12 HOURS SCHEDULED
Status: DISCONTINUED | OUTPATIENT
Start: 2020-12-18 | End: 2020-12-19 | Stop reason: HOSPADM

## 2020-12-18 RX ORDER — SODIUM CHLORIDE 9 MG/ML
INJECTION, SOLUTION INTRAVENOUS CONTINUOUS
Status: DISCONTINUED | OUTPATIENT
Start: 2020-12-18 | End: 2020-12-19

## 2020-12-18 RX ORDER — DIPHENHYDRAMINE HYDROCHLORIDE 50 MG/ML
12.5 INJECTION INTRAMUSCULAR; INTRAVENOUS
Status: DISCONTINUED | OUTPATIENT
Start: 2020-12-18 | End: 2020-12-18

## 2020-12-18 RX ORDER — IBUPROFEN 800 MG/1
800 TABLET ORAL DAILY
Status: DISCONTINUED | OUTPATIENT
Start: 2020-12-18 | End: 2020-12-19 | Stop reason: HOSPADM

## 2020-12-18 RX ORDER — DEXAMETHASONE SODIUM PHOSPHATE 10 MG/ML
4 INJECTION INTRAMUSCULAR; INTRAVENOUS
Status: DISCONTINUED | OUTPATIENT
Start: 2020-12-18 | End: 2020-12-18

## 2020-12-18 RX ORDER — CEFAZOLIN SODIUM 1 G/3ML
INJECTION, POWDER, FOR SOLUTION INTRAMUSCULAR; INTRAVENOUS PRN
Status: DISCONTINUED | OUTPATIENT
Start: 2020-12-18 | End: 2020-12-18 | Stop reason: SDUPTHER

## 2020-12-18 RX ORDER — HYDRALAZINE HYDROCHLORIDE 20 MG/ML
5 INJECTION INTRAMUSCULAR; INTRAVENOUS EVERY 10 MIN PRN
Status: DISCONTINUED | OUTPATIENT
Start: 2020-12-18 | End: 2020-12-18

## 2020-12-18 RX ORDER — SODIUM CHLORIDE 9 MG/ML
INJECTION, SOLUTION INTRAVENOUS CONTINUOUS PRN
Status: DISCONTINUED | OUTPATIENT
Start: 2020-12-18 | End: 2020-12-18 | Stop reason: SDUPTHER

## 2020-12-18 RX ORDER — FENTANYL CITRATE 50 UG/ML
25 INJECTION, SOLUTION INTRAMUSCULAR; INTRAVENOUS EVERY 5 MIN PRN
Status: DISCONTINUED | OUTPATIENT
Start: 2020-12-18 | End: 2020-12-18

## 2020-12-18 RX ORDER — HEPARIN SODIUM 1000 [USP'U]/ML
INJECTION, SOLUTION INTRAVENOUS; SUBCUTANEOUS PRN
Status: DISCONTINUED | OUTPATIENT
Start: 2020-12-18 | End: 2020-12-18 | Stop reason: SDUPTHER

## 2020-12-18 RX ORDER — ALBUTEROL SULFATE 90 UG/1
2 AEROSOL, METERED RESPIRATORY (INHALATION) EVERY 6 HOURS PRN
COMMUNITY

## 2020-12-18 RX ORDER — BUDESONIDE AND FORMOTEROL FUMARATE DIHYDRATE 160; 4.5 UG/1; UG/1
2 AEROSOL RESPIRATORY (INHALATION) 2 TIMES DAILY
COMMUNITY

## 2020-12-18 RX ORDER — FENTANYL CITRATE 50 UG/ML
INJECTION, SOLUTION INTRAMUSCULAR; INTRAVENOUS PRN
Status: DISCONTINUED | OUTPATIENT
Start: 2020-12-18 | End: 2020-12-18 | Stop reason: SDUPTHER

## 2020-12-18 RX ORDER — ACETAMINOPHEN 325 MG/1
650 TABLET ORAL EVERY 4 HOURS PRN
Status: DISCONTINUED | OUTPATIENT
Start: 2020-12-18 | End: 2020-12-19 | Stop reason: HOSPADM

## 2020-12-18 RX ORDER — PHENYLEPHRINE HYDROCHLORIDE 10 MG/ML
INJECTION INTRAVENOUS PRN
Status: DISCONTINUED | OUTPATIENT
Start: 2020-12-18 | End: 2020-12-18 | Stop reason: SDUPTHER

## 2020-12-18 RX ORDER — ONDANSETRON 2 MG/ML
4 INJECTION INTRAMUSCULAR; INTRAVENOUS
Status: DISCONTINUED | OUTPATIENT
Start: 2020-12-18 | End: 2020-12-18

## 2020-12-18 RX ORDER — LIDOCAINE HYDROCHLORIDE 10 MG/ML
INJECTION, SOLUTION INFILTRATION; PERINEURAL PRN
Status: DISCONTINUED | OUTPATIENT
Start: 2020-12-18 | End: 2020-12-18 | Stop reason: SDUPTHER

## 2020-12-18 RX ORDER — PROTAMINE SULFATE 10 MG/ML
INJECTION, SOLUTION INTRAVENOUS PRN
Status: DISCONTINUED | OUTPATIENT
Start: 2020-12-18 | End: 2020-12-18 | Stop reason: SDUPTHER

## 2020-12-18 RX ORDER — IPRATROPIUM BROMIDE AND ALBUTEROL SULFATE 2.5; .5 MG/3ML; MG/3ML
1 SOLUTION RESPIRATORY (INHALATION) EVERY 4 HOURS PRN
Status: DISCONTINUED | OUTPATIENT
Start: 2020-12-18 | End: 2020-12-19 | Stop reason: HOSPADM

## 2020-12-18 RX ORDER — PROMETHAZINE HYDROCHLORIDE 12.5 MG/1
12.5 TABLET ORAL EVERY 6 HOURS PRN
Status: DISCONTINUED | OUTPATIENT
Start: 2020-12-18 | End: 2020-12-19 | Stop reason: HOSPADM

## 2020-12-18 RX ORDER — PROPOFOL 10 MG/ML
INJECTION, EMULSION INTRAVENOUS CONTINUOUS PRN
Status: DISCONTINUED | OUTPATIENT
Start: 2020-12-18 | End: 2020-12-18 | Stop reason: SDUPTHER

## 2020-12-18 RX ORDER — ATENOLOL 50 MG/1
50 TABLET ORAL DAILY
Status: DISCONTINUED | OUTPATIENT
Start: 2020-12-18 | End: 2020-12-19 | Stop reason: HOSPADM

## 2020-12-18 RX ORDER — ONDANSETRON 2 MG/ML
4 INJECTION INTRAMUSCULAR; INTRAVENOUS EVERY 6 HOURS PRN
Status: DISCONTINUED | OUTPATIENT
Start: 2020-12-18 | End: 2020-12-19 | Stop reason: HOSPADM

## 2020-12-18 RX ORDER — SODIUM CHLORIDE 9 MG/ML
INJECTION, SOLUTION INTRAVENOUS CONTINUOUS
Status: DISCONTINUED | OUTPATIENT
Start: 2020-12-18 | End: 2020-12-18

## 2020-12-18 RX ORDER — OXYCODONE HYDROCHLORIDE AND ACETAMINOPHEN 5; 325 MG/1; MG/1
1 TABLET ORAL PRN
Status: DISCONTINUED | OUTPATIENT
Start: 2020-12-18 | End: 2020-12-18

## 2020-12-18 RX ORDER — BUDESONIDE AND FORMOTEROL FUMARATE DIHYDRATE 160; 4.5 UG/1; UG/1
2 AEROSOL RESPIRATORY (INHALATION) 2 TIMES DAILY
Status: DISCONTINUED | OUTPATIENT
Start: 2020-12-18 | End: 2020-12-19 | Stop reason: HOSPADM

## 2020-12-18 RX ADMIN — PHENYLEPHRINE HYDROCHLORIDE 100 MCG: 10 INJECTION INTRAVENOUS at 12:16

## 2020-12-18 RX ADMIN — LIDOCAINE HYDROCHLORIDE 50 MG: 10 INJECTION, SOLUTION INFILTRATION; PERINEURAL at 12:04

## 2020-12-18 RX ADMIN — HEPARIN SODIUM 5000 UNITS: 1000 INJECTION INTRAVENOUS; SUBCUTANEOUS at 12:27

## 2020-12-18 RX ADMIN — SODIUM CHLORIDE: 900 INJECTION INTRAVENOUS at 11:30

## 2020-12-18 RX ADMIN — SODIUM CHLORIDE: 9 INJECTION, SOLUTION INTRAVENOUS at 13:34

## 2020-12-18 RX ADMIN — PHENYLEPHRINE HYDROCHLORIDE 100 MCG: 10 INJECTION INTRAVENOUS at 12:18

## 2020-12-18 RX ADMIN — GUAIFENESIN 1200 MG: 600 TABLET, EXTENDED RELEASE ORAL at 17:44

## 2020-12-18 RX ADMIN — PROTAMINE SULFATE 5 MG: 10 INJECTION, SOLUTION INTRAVENOUS at 13:00

## 2020-12-18 RX ADMIN — PHENYLEPHRINE HYDROCHLORIDE 100 MCG: 10 INJECTION INTRAVENOUS at 12:28

## 2020-12-18 RX ADMIN — PHENYLEPHRINE HYDROCHLORIDE 100 MCG: 10 INJECTION INTRAVENOUS at 12:39

## 2020-12-18 RX ADMIN — ENOXAPARIN SODIUM 40 MG: 40 INJECTION SUBCUTANEOUS at 17:44

## 2020-12-18 RX ADMIN — PHENYLEPHRINE HYDROCHLORIDE 50 MCG/MIN: 10 INJECTION INTRAVENOUS at 12:31

## 2020-12-18 RX ADMIN — FENTANYL CITRATE 25 MCG: 50 INJECTION, SOLUTION INTRAMUSCULAR; INTRAVENOUS at 12:20

## 2020-12-18 RX ADMIN — Medication 10 ML: at 20:46

## 2020-12-18 RX ADMIN — SODIUM CHLORIDE: 9 INJECTION, SOLUTION INTRAVENOUS at 10:23

## 2020-12-18 RX ADMIN — PHENYLEPHRINE HYDROCHLORIDE 200 MCG: 10 INJECTION INTRAVENOUS at 12:24

## 2020-12-18 RX ADMIN — PROPOFOL 200 MCG/KG/MIN: 10 INJECTION, EMULSION INTRAVENOUS at 12:04

## 2020-12-18 RX ADMIN — SODIUM CHLORIDE: 900 INJECTION INTRAVENOUS at 12:40

## 2020-12-18 RX ADMIN — FENTANYL CITRATE 25 MCG: 50 INJECTION, SOLUTION INTRAMUSCULAR; INTRAVENOUS at 12:04

## 2020-12-18 RX ADMIN — PROTAMINE SULFATE 5 MG: 10 INJECTION, SOLUTION INTRAVENOUS at 13:02

## 2020-12-18 RX ADMIN — CEFAZOLIN 2000 MG: 1 INJECTION, POWDER, FOR SOLUTION INTRAMUSCULAR; INTRAVENOUS at 12:09

## 2020-12-18 RX ADMIN — PHENYLEPHRINE HYDROCHLORIDE 100 MCG: 10 INJECTION INTRAVENOUS at 13:04

## 2020-12-18 RX ADMIN — SODIUM CHLORIDE: 900 INJECTION INTRAVENOUS at 12:35

## 2020-12-18 RX ADMIN — FUROSEMIDE 40 MG: 40 TABLET ORAL at 17:44

## 2020-12-18 RX ADMIN — IBUPROFEN 800 MG: 800 TABLET, FILM COATED ORAL at 17:44

## 2020-12-18 ASSESSMENT — PULMONARY FUNCTION TESTS
PIF_VALUE: 1
PIF_VALUE: 0
PIF_VALUE: 1

## 2020-12-18 ASSESSMENT — ENCOUNTER SYMPTOMS
CHEST TIGHTNESS: 0
TROUBLE SWALLOWING: 0
SHORTNESS OF BREATH: 0
COUGH: 0

## 2020-12-18 ASSESSMENT — PAIN SCALES - GENERAL
PAINLEVEL_OUTOF10: 3
PAINLEVEL_OUTOF10: 0

## 2020-12-18 NOTE — ANESTHESIA PRE PROCEDURE
 NONFORMULARY Take 1 tablet by mouth 2 times daily PRESER VISION  /  AREDS2      guaiFENesin (MUCINEX) 600 MG extended release tablet Take 1,200 mg by mouth 2 times daily      loratadine (CLARITIN) 10 MG tablet Take 10 mg by mouth daily      Multiple Vitamins-Minerals (CENTRAL-DEANDRA PO) Take 1 tablet by mouth every evening       Current Facility-Administered Medications   Medication Dose Route Frequency Provider Last Rate Last Admin    0.9 % sodium chloride infusion   Intravenous Continuous Keena Mo MD 75 mL/hr at 12/18/20 1023 New Bag at 12/18/20 1023    sodium chloride flush 0.9 % injection 10 mL  10 mL Intravenous 2 times per day Keena Mo MD        sodium chloride flush 0.9 % injection 10 mL  10 mL Intravenous PRN Keena Mo MD           Allergies: Allergies   Allergen Reactions    Pcn [Penicillins] Rash       Problem List:    Patient Active Problem List   Diagnosis Code    AAA (abdominal aortic aneurysm) without rupture (HCC) I71.4    AAA (abdominal aortic aneurysm) ([facility] I71.4       Past Medical History:        Diagnosis Date    Aortic aneurysm ([address]     Arthritis     COPD (chronic obstructive pulmonary disease) ([address]     BROWN (dyspnea on exertion)     Former smoker     Herpes genitalis     HTN (hypertension)     Inguinal hernia     On home oxygen therapy     Prostate cancer ([address]     Short-term memory loss     Skin cancer     Walker as ambulation aid        Past Surgical History:        Procedure Laterality Date    ENDOVASCULAR THORACIC AORTIC ANEURYSM REPAIR  09/03/2020    DR Nancyann Kay REYES    HERNIA REPAIR      OTHER SURGICAL HISTORY  12/18/2020    AAA Repair    PROSTATECTOMY      SKIN CANCER EXCISION      FOREHEAD / RIGHT CHEST    TONSILLECTOMY AND ADENOIDECTOMY         Social History:    Social History     Tobacco Use    Smoking status: Former Smoker    Smokeless tobacco: Never Used   Substance Use Topics    Alcohol use:  Yes

## 2020-12-18 NOTE — OP NOTE
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    Post-Op Diagnosis: Same         1) percutaneous access with ultrasound guidance right common femoral artery  2) aortogram with pelvic runoff  3) treatment of endoleak with abscess endoanchors x10    Surgeon: Yonathan Hinson MD    Assistant:  Ayse Marquez MD    Anesthesia: MAC    Estimated Blood Loss (mL): less than 50     Complications: None    Specimens:   * No specimens in log *    Implants:  * No implants in log *      Drains: * No LDAs found *    Findings: Type I endoleak was identified and was no longer visible after endoanchor placement    Detailed Description of Procedure: This is an 81-year-old male with a previous aneurysm repair of a highly tortuous abdominal aortic aneurysm. At 3 months his CTA of the abdomen pelvis showed a type I endoleak and thus was indicated for repair. He was brought to the Cath Lab and placed supine the groins were cleaned and prepped in usual fashion sterile drapes were applied. Ultrasound was used to identify the right common femoral artery. The artery was cannulated with a micropuncture needle and catheter. Using the Seldinger technique a 5 Persian sheath was placed. 2 percutaneous preclosed devices were deployed. A stiff wire was then placed and a 16 Persian sheath was advanced into the stent graft. The at this endoanchor device was then advanced and all 10 of the endoanchors were placed around the neck of the aorta from a steep Portuguese view all the way to an LEA view. Balloon angioplasty was performed with a Reliant balloon. On completion aortogram there is no longer any evidence of endoleak. The sheath was removed and the percutaneous closure devices were used to close the arteriotomy. The procedure was tolerated well without complication. Electronically signed by Higinio Brannon MD on 12/18/2020 at 1:11 PM

## 2020-12-18 NOTE — H&P
Rose Paulino is an 81 y.o.  male. He underwent endovascular aortic repair with a highly tortuous aorta in September 2020. At his 3-month checkup he had a repeat CTA of the abdomen and pelvis which shows a type I endoleak. This is likely due to the tortuosity of his aorta and appears that it can be fixed with endovascular anchors. Discussed this with the patient and his son. Is being done under monitored anesthesia care because of his home oxygen requirement. I plan for him to be admitted overnight and if he is doing well in the morning can be discharged. Past Medical History:   Diagnosis Date    Aortic aneurysm ([address]     Arthritis     COPD (chronic obstructive pulmonary disease) (HCC)     BROWN (dyspnea on exertion)     Former smoker     Herpes genitalis     HTN (hypertension)     Inguinal hernia     On home oxygen therapy     Prostate cancer ([address]     Short-term memory loss     Skin cancer     Walker as ambulation aid      Past Surgical History:   Procedure Laterality Date    ENDOVASCULAR THORACIC AORTIC ANEURYSM REPAIR  09/03/2020    DR Raymona Bernard REYES    HERNIA REPAIR      OTHER SURGICAL HISTORY  12/18/2020    AAA Repair    PROSTATECTOMY      SKIN CANCER EXCISION      FOREHEAD / RIGHT CHEST    TONSILLECTOMY AND ADENOIDECTOMY       Social History     Socioeconomic History    Marital status:       Spouse name: None    Number of children: None    Years of education: None    Highest education level: None   Occupational History    None   Social Needs    Financial resource strain: None    Food insecurity     Worry: None     Inability: None    Transportation needs     Medical: None     Non-medical: None   Tobacco Use    Smoking status: Former Smoker    Smokeless tobacco: Never Used   Substance and Sexual Activity    Alcohol use: Yes     Comment: OCCASIONAL    Drug use: Never    Sexual activity: None   Lifestyle    Physical activity     Days per week: None Minutes per session: None    Stress: None   Relationships    Social connections     Talks on phone: None     Gets together: None     Attends Sikhism service: None     Active member of club or organization: None     Attends meetings of clubs or organizations: None     Relationship status: None    Intimate partner violence     Fear of current or ex partner: None     Emotionally abused: None     Physically abused: None     Forced sexual activity: None   Other Topics Concern    None   Social History Narrative    None     History reviewed. No pertinent family history. Allergies: Allergies   Allergen Reactions    Pcn [Penicillins] Rash       Active Problems:    * No active hospital problems. *  Resolved Problems:    * No resolved hospital problems. *    Blood pressure 113/67, pulse 60, temperature 98.6 °F (37 °C), temperature source Oral, resp. rate 24, height 6' (1.829 m), weight 240 lb (108.9 kg), SpO2 95 %. Review of Systems   Constitutional: Negative for activity change, appetite change and fever. HENT: Negative for congestion and trouble swallowing. Eyes: Negative for visual disturbance. Respiratory: Negative for cough, chest tightness and shortness of breath. Cardiovascular: Negative for chest pain. Physical Exam  Constitutional:       General: He is not in acute distress. Appearance: Normal appearance. He is normal weight. HENT:      Head: Normocephalic and atraumatic. Nose: No congestion. Mouth/Throat:      Mouth: Mucous membranes are moist.   Eyes:      General: No scleral icterus. Extraocular Movements: Extraocular movements intact. Conjunctiva/sclera: Conjunctivae normal.      Pupils: Pupils are equal, round, and reactive to light. Cardiovascular:      Rate and Rhythm: Normal rate and regular rhythm. Heart sounds: Normal heart sounds. Pulmonary:      Effort: Pulmonary effort is normal.      Breath sounds: Normal breath sounds.    Skin: General: Skin is warm and dry. Capillary Refill: Capillary refill takes less than 2 seconds. Coloration: Skin is not jaundiced. Neurological:      General: No focal deficit present. Mental Status: He is alert and oriented to person, place, and time. Mental status is at baseline. Psychiatric:         Mood and Affect: Mood normal.         Thought Content: Thought content normal.         Judgment: Judgment normal.         Assessment:  67-year-old male with type I endoleak status post endovascular repair of abdominal aortic aneurysm    Plan:  He is scheduled to have endovascular repair of his aorta which has a type I endoleak.     Carin Hall MD  12/18/2020

## 2020-12-18 NOTE — ANESTHESIA POSTPROCEDURE EVALUATION
Department of Anesthesiology  Postprocedure Note    Patient: Peggy Chaudhry  MRN: [id number]  YOB: 1935  Date of evaluation: 12/18/2020  Time:  2:30 PM     Procedure Summary     Date: 12/18/20 Room / Location: [facility] Cath Lab    Anesthesia Start: 1157 Anesthesia Stop: 1321    Procedure: AAA ENDOGRAFT W/ ANESTHESIA Diagnosis:       Abdominal aortic aneurysm, ruptured      Endoleak of aortic graft ([address]    Scheduled Providers:  Responsible Provider: Dewey Drew MD    Anesthesia Type: general, MAC ASA Status: 3          Anesthesia Type: general, MAC    Kendall Phase I:      Kendall Phase II:      Last vitals: Reviewed and per EMR flowsheets.        Anesthesia Post Evaluation    Patient location during evaluation: PACU  Patient participation: complete - patient participated  Level of consciousness: awake and alert  Pain score: 1  Airway patency: patent  Nausea & Vomiting: no nausea and no vomiting  Complications: no  Cardiovascular status: blood pressure returned to baseline  Respiratory status: acceptable  Hydration status: euvolemic

## 2020-12-18 NOTE — CARE COORDINATION
Case Management Initial Discharge Plan  Peggy Chaudhry,             Met with:patient & son to discuss discharge plans. Information verified: address, contacts, phone number, , insurance Yes    Emergency Contact/Next of Kin name & number: layton    PCP: Farheen Bryan MD  Date of last visit: tuesday    Insurance Provider: medicare/paramout    Discharge Planning    Living Arrangements:    lives aone  Support Systems:    patients son said his sister does most care     Home has 1 stories  3 stairs to climb to get into front door    Patient able to perform ADL's:Assisted    Current Services (outpatient & in home) none  DME equipment: rollator, o2  Pharmacy: [facility]      Receiving oral anticoagulation therapy? No          Potential Assistance Needed:   f/u pcp    Patient agreeable to home care: son yariel he just finished home PT    Prior SNF/Rehab Placement and Facility: yes [place] 2020       Evaluation: no    Expected Discharge date:       Patient expects to be discharged to:   home  Follow Up Appointment: Best Day/ Time:  afternoon    Transportation provider: family   Transportation arrangements needed for discharge: No    Readmission Risk              Risk of Unplanned Readmission:        6             Does patient have a readmission risk score greater than 14?: No  If yes, follow-up appointment must be made within 7 days of discharge.      Goals of Care: pain control      Discharge Plan: return home           Electronically signed by Kely Richardson RN on 20 at 5:37 PM EST

## 2020-12-19 VITALS
DIASTOLIC BLOOD PRESSURE: 62 MMHG | WEIGHT: 240 LBS | SYSTOLIC BLOOD PRESSURE: 101 MMHG | HEART RATE: 72 BPM | BODY MASS INDEX: 32.51 KG/M2 | TEMPERATURE: 98.3 F | RESPIRATION RATE: 20 BRPM | HEIGHT: 72 IN | OXYGEN SATURATION: 89 %

## 2020-12-19 LAB
EKG ATRIAL RATE: 57 BPM
EKG P AXIS: 71 DEGREES
EKG P-R INTERVAL: 178 MS
EKG Q-T INTERVAL: 446 MS
EKG QRS DURATION: 132 MS
EKG QTC CALCULATION (BAZETT): 434 MS
EKG R AXIS: 23 DEGREES
EKG T AXIS: 49 DEGREES
EKG VENTRICULAR RATE: 57 BPM

## 2020-12-19 PROCEDURE — 2700000000 HC OXYGEN THERAPY PER DAY

## 2020-12-19 PROCEDURE — 6370000000 HC RX 637 (ALT 250 FOR IP): Performed by: SURGERY

## 2020-12-19 PROCEDURE — 93010 ELECTROCARDIOGRAM REPORT: CPT | Performed by: INTERNAL MEDICINE

## 2020-12-19 PROCEDURE — 2580000003 HC RX 258: Performed by: SURGERY

## 2020-12-19 PROCEDURE — 6360000002 HC RX W HCPCS: Performed by: SURGERY

## 2020-12-19 PROCEDURE — 94664 DEMO&/EVAL PT USE INHALER: CPT

## 2020-12-19 PROCEDURE — 94640 AIRWAY INHALATION TREATMENT: CPT

## 2020-12-19 PROCEDURE — 94761 N-INVAS EAR/PLS OXIMETRY MLT: CPT

## 2020-12-19 RX ADMIN — ATENOLOL 50 MG: 50 TABLET ORAL at 10:28

## 2020-12-19 RX ADMIN — GUAIFENESIN 1200 MG: 600 TABLET, EXTENDED RELEASE ORAL at 10:28

## 2020-12-19 RX ADMIN — IBUPROFEN 800 MG: 800 TABLET, FILM COATED ORAL at 10:28

## 2020-12-19 RX ADMIN — Medication 10 ML: at 10:28

## 2020-12-19 RX ADMIN — FUROSEMIDE 40 MG: 40 TABLET ORAL at 10:28

## 2020-12-19 RX ADMIN — BUDESONIDE AND FORMOTEROL FUMARATE DIHYDRATE 2 PUFF: 160; 4.5 AEROSOL RESPIRATORY (INHALATION) at 09:05

## 2020-12-19 RX ADMIN — ENOXAPARIN SODIUM 40 MG: 40 INJECTION SUBCUTANEOUS at 10:28

## 2020-12-19 ASSESSMENT — PAIN SCALES - GENERAL: PAINLEVEL_OUTOF10: 3

## 2020-12-19 NOTE — CARE COORDINATION
Met with patient to discuss transitional planning, denies needs at this time, will need Home O2 to transport home

## 2020-12-19 NOTE — PROGRESS NOTES
Writer went through discharge instructions with pt daughter and pt at bedside, pt and daughter verbalized understanding of all instructions. IV taken out without any complications. Pt dressed, eating lunch, and will be discharged after he eats. Pt education on not lifting anything heavy for 1 week and that he is okay to take a shower.

## 2020-12-19 NOTE — PROGRESS NOTES
Vascular Surgery   Progress Note    PATIENT NAME: Trevor Min     TODAY'S DATE: 12/19/2020, 6:40 AM    SUBJECTIVE:     Pt seen and examined at bedside. No acute overnight events. Does have some soreness in right groin. Otherwise he feels well. Patient does complain of left leg pain at night that has been going on for a while. Denies numbness/tingling. On 4L NC (home). OBJECTIVE:   VITALS:  BP (!) 91/56   Pulse 64   Temp 98.1 °F (36.7 °C) (Oral)   Resp 24   Ht 6' (1.829 m)   Wt 240 lb (108.9 kg)   SpO2 95%   BMI 32.55 kg/m²      INTAKE/OUTPUT:      Intake/Output Summary (Last 24 hours) at 12/19/2020 0640  Last data filed at 12/19/2020 7730  Gross per 24 hour   Intake 2050 ml   Output 1570 ml   Net 480 ml       PHYSICAL EXAM:  General Appearance: awake, alert, oriented, in no acute distress  HEENT:  Normocephalic, atraumatic, mucus membranes moist   Heart: Regular rate and rhythm  Lungs: normal effort with symmetric rise and fall of chest wall  Abdomen: soft, nondistended, nontender to palpation  Extremities: RLE - palpable DP pulse, right groin without eccymosis, hematoma or signs of bleeding; LLE - palpable DP pulse; b/l LE swelling with pitting edema unchanged  Skin: Skin color, texture, turgor normal. No rashes or lesions. Data:  CBC: No results for input(s): WBC, HGB, PLT in the last 72 hours. Chemistry:   Recent Labs     12/18/20  1001   CREATININE 0.90   LABGLOM >60       Radiology Review:    No results found. ASSESSMENT:  Active Hospital Problems    Diagnosis Date Noted    Endoleak of aortic graft ([address] [facility] 12/18/2020       1. 81 y.o. male with Type I endoleak s/p AAA repair POD# 1 s/p repair of endoleak with endoanchors via R femoral artery percutaneous access, completion aortogram      Plan:  1. Continue medical mgmt and supportive care per primary team  2. Continue home meds  3. General diet  4. Monitor I/Os  5. Lovenox daily  6.  Continue to encourage ambulation/activity 1. OOB and ambulate today  2. Onto chair  7. Continue to encourage incentive spirometry  8. OK to shower  9.  Discharge home today after ambulation    Electronically signed by Xi Michaud DO  on 12/19/2020 at 6:40 AM

## 2020-12-19 NOTE — PROGRESS NOTES
Writer attempted to call daughter to let her know pt will be discharge, VM has not been set up yet. Will attempt again to call again in a little bit.

## 2020-12-22 NOTE — PROGRESS NOTES
Physician Progress Note      Jeff Garcia  CSN #:                  [id number]  :                       1935  ADMIT DATE:       2020 1:27 PM  Abdirahman Bosch Chevak DATE:        2020 1:46 PM  RESPONDING  PROVIDER #:        Yomaira Foster MD          QUERY TEXT:    Pt admitted with endoleak s/p endoanchor placement. Pt noted to have COPD   requiring home oxygen. If possible, please document in the progress notes and   discharge summary if you are evaluating and/or treating any of the following: The medical record reflects the following:  Risk Factors: COPD  Clinical Indicators: Home oxygen use; Initial O2 sat 95% on 4L/nc O2  Treatment: Continue supplemental oxygen    Thank you, Tasha Arreguin RN, CDS. Please call with any questions [phone]    M-F 6a-2:30p  Options provided:  -- Chronic respiratory failure with hypoxia  -- Chronic respiratory failure with hypercapnia  -- Other - I will add my own diagnosis  -- Disagree - Not applicable / Not valid  -- Disagree - Clinically unable to determine / Unknown  -- Refer to Clinical Documentation Reviewer    PROVIDER RESPONSE TEXT:    This patient has chronic respiratory failure with hypoxia.     Query created by: Ayde Rehman on 2020 2:25 PM      Electronically signed by:  Yomaira Foster MD 2020 4:39 PM

## 2020-12-23 NOTE — DISCHARGE SUMMARY
Surgery Discharge Summary     Patient Identification  Phil Venegas is a 81 y.o. male. :  1935  Admit Date:  2020    Discharge date:   2020  1:46 PM                                   Disposition: home    Discharge Diagnoses:   Patient Active Problem List   Diagnosis    AAA (abdominal aortic aneurysm) without rupture ([address]    AAA (abdominal aortic aneurysm) (HCC)    Endoleak of aortic graft (HCC)       Condition on discharge: Stable    Consults: Case management, respiratory    Surgery: Endovascular repair of type I endoleak with endoanchors ()    Patient Instructions: Activity: no heavy lifting, pushing, pulling for 6 weeks, no driving for 2 weeks or while on analgesics  Diet: As tolerated  Follow-up with in 2 weeks. See pre-printed instructions in chart and given to patient upon discharge.     Discharge Medications:      Everett Bound   Home Medication Instructions [facility]:[phone]    Printed on:20 1184   Medication Information                      albuterol sulfate HFA (PROAIR HFA) 108 (90 Base) MCG/ACT inhaler  Inhale 2 puffs into the lungs every 6 hours as needed for Wheezing             atenolol (TENORMIN) 50 MG tablet  Take 50 mg by mouth daily             budesonide-formoterol (SYMBICORT) 160-4.5 MCG/ACT AERO  Inhale 2 puffs into the lungs 2 times daily             furosemide (LASIX) 40 MG tablet  Take 40 mg by mouth daily             guaiFENesin (MUCINEX) 600 MG extended release tablet  Take 1,200 mg by mouth 2 times daily             ibuprofen (ADVIL;MOTRIN) 800 MG tablet  Take 800 mg by mouth daily             loratadine (CLARITIN) 10 MG tablet  Take 10 mg by mouth daily             Multiple Vitamins-Minerals (CENTRAL-DEANDRA PO)  Take 1 tablet by mouth every evening             NONFORMULARY  Take 1 tablet by mouth 2 times daily PRESER VISION  /  AREDS2                  HPI and Hospital Course: 81 y.o. male presented on 12/18/2020 for elective repair of his type I endoleak involving his previously placed EVAR. He tolerated the procedure well and was monitored overnight. Hospital course was unremarkable. On day of discharge pt was tolerating regular diet, pain controlled with oral medications and ambulating without difficulty.     Electronically signed by Surya Royal MD on 12/23/2020 at 1:42 PM